# Patient Record
Sex: MALE | Race: WHITE
[De-identification: names, ages, dates, MRNs, and addresses within clinical notes are randomized per-mention and may not be internally consistent; named-entity substitution may affect disease eponyms.]

---

## 2017-01-01 ENCOUNTER — HOSPITAL ENCOUNTER (EMERGENCY)
Dept: HOSPITAL 80 - FED | Age: 27
Discharge: HOME | End: 2017-01-01
Payer: COMMERCIAL

## 2017-01-01 VITALS — DIASTOLIC BLOOD PRESSURE: 74 MMHG | HEART RATE: 66 BPM | SYSTOLIC BLOOD PRESSURE: 122 MMHG | OXYGEN SATURATION: 96 %

## 2017-01-01 VITALS — RESPIRATION RATE: 16 BRPM

## 2017-01-01 VITALS — TEMPERATURE: 97.9 F

## 2017-01-01 DIAGNOSIS — Z90.49: ICD-10-CM

## 2017-01-01 DIAGNOSIS — F10.120: ICD-10-CM

## 2017-01-01 DIAGNOSIS — R10.84: Primary | ICD-10-CM

## 2017-01-01 LAB
% IMMATURE GRANULYOCYTES: 0.2 % (ref 0–1.1)
ABSOLUTE IMMATURE GRANULOCYTES: 0.02 10^3/UL (ref 0–0.1)
ABSOLUTE NRBC COUNT: 0 10^3/UL (ref 0–0.01)
ADD DIFF?: NO
ADD MORPH?: NO
ADD SCAN?: NO
ALBUMIN SERPL-MCNC: 5 G/DL (ref 3.5–5)
ALP SERPL-CCNC: 83 IU/L (ref 38–126)
ALT SERPL-CCNC: 96 IU/L (ref 21–72)
AMYLASE SERPL-CCNC: 95 IU/L (ref 30–110)
ANION GAP SERPL CALC-SCNC: 21 MEQ/L (ref 8–16)
APTT BLD: 24.6 SEC (ref 23–38)
AST SERPL-CCNC: 124 IU/L (ref 17–59)
ATYPICAL LYMPHOCYTE FLAG: 0 (ref 0–99)
BILIRUB SERPL-MCNC: 0.4 MG/DL (ref 0.1–1.4)
BILIRUBIN-CONJUGATED: 0.3 MG/DL (ref 0–0.5)
BILIRUBIN-UNCONJUGATED: 0.1 MG/DL (ref 0–1.1)
CALCIUM SERPL-MCNC: 9.2 MG/DL (ref 8.5–10.4)
CHLORIDE SERPL-SCNC: 107 MEQ/L (ref 97–110)
CO2 SERPL-SCNC: 20 MEQ/L (ref 22–31)
COLOR UR: (no result)
CREAT SERPL-MCNC: 1 MG/DL (ref 0.7–1.3)
ERYTHROCYTE [DISTWIDTH] IN BLOOD BY AUTOMATED COUNT: 12.1 % (ref 11.5–15.2)
ETHANOL SERPL-MCNC: 203 MG/DL (ref 0–10)
FRAGMENT RBC FLAG: 0 (ref 0–99)
GFR SERPL CREATININE-BSD FRML MDRD: > 60 ML/MIN/{1.73_M2}
GLUCOSE SERPL-MCNC: 99 MG/DL (ref 70–100)
HCT VFR BLD CALC: 42.9 % (ref 40–51)
HGB BLD-MCNC: 15.1 G/DL (ref 13.7–17.5)
INR PPP: 1.08 (ref 0.83–1.16)
LEFT SHIFT FLG: 0 (ref 0–99)
LIPEMIA HEMOLYSIS FLAG: 90 (ref 0–99)
MCH RBC BLDCO QN: 30.7 PG (ref 27.9–34.1)
MCHC RBC AUTO-ENTMCNC: 35.2 G/DL (ref 32.4–36.7)
MCV RBC AUTO: 87.2 FL (ref 81.5–99.8)
NITRITE UR QL STRIP: NEGATIVE
NRBC-AUTO%: 0 % (ref 0–0.2)
PH UR STRIP: 6 [PH] (ref 5–7.5)
PLATELET # BLD: 275 10^3/UL (ref 150–400)
PLATELET CLUMPS FLAG: 60 (ref 0–99)
PMV BLD AUTO: 9.3 FL (ref 8.7–11.7)
POTASSIUM SERPL-SCNC: 3.3 MEQ/L (ref 3.5–5.2)
PROT SERPL-MCNC: 7.5 G/DL (ref 6.3–8.2)
PROTHROMBIN TIME: 13.9 SEC (ref 12–15)
RBC # BLD AUTO: 4.92 10^6/UL (ref 4.4–6.38)
SODIUM SERPL-SCNC: 148 MEQ/L (ref 134–144)
SP GR UR STRIP: 1.01 (ref 1–1.03)

## 2017-01-01 PROCEDURE — G0480 DRUG TEST DEF 1-7 CLASSES: HCPCS

## 2017-01-01 NOTE — CT
CT Scan of the Abdomen and Pelvis (With Contrast) at 0313 hours

 

History:  Epigastric pain after binge drinking, cholecystectomy 2 weeks ago. Nausea and vomiting.

 

Comparison: None available.

 

Technique: Axial computed tomographic images of the abdomen and pelvis were obtained with the unevent
ful intravenous administration of 90 mL Isovue-300 contrast. No oral or rectal contrast which limits 
the study. Dose reduction techniques were utilized.

 

CT Abdomen Findings:

 

Lung bases: Normal.

Liver: Normal.

Biliary system: Gallbladder is surgically absent. No biliary ductal dilation. No fluid collection in 
the gallbladder fossa. No evidence of bile leak. No pneumoperitoneum.

Spleen: Normal.

Pancreas: Normal.

Adrenals: Normal.

Kidneys: No obstruction or solid masses.

Abdominal Aorta: No aneurysm.

No bowel obstruction, ascites, or significant retroperitoneal lymphadenopathy.

 

CT Pelvis Findings: Appendix appears normal without inflammatory changes. No pelvic fluid collections
.

 

Impression:

1. Prior cholecystectomy without biliary ductal dilation, biliary leak, pneumoperitoneum or drainable
 abscess.

2. No CT evidence of appendicitis, abscess or bowel obstruction.

 

 

Findings and recommendations discussed with Emergency Department physician, Dr. Patrick Lazar at 03
40 hours today.

 

Final report concurs with initial preliminary interpretation.

## 2017-01-01 NOTE — EDPHY
H & P


HPI/ROS: 


HPI





CHIEF COMPLAINT:  Nausea, vomiting, epigastric abdominal pain





HISTORY OF PRESENT ILLNESS:  This patient 26-year-old male, recent 

cholecystectomy at [facility], he had his gallbladder removed on December 21st, he presents to the emergency room by EMS with epigastric abdominal pain 

nausea vomiting.  Patient tells me that he had multiple liquor drinks and beer 

this evening girlfriend at bedside is an RN.  She states that he started 

complaining of epigastric pain and nausea vomiting after multiple alcoholic 

beverages.  He tells me that he just had his gallbladder removed on December 21st.  He now has 10/10 burning epigastric pain with associated nausea.





Past Medical History: denies significant medical history





Past Surgical History:  Cholecystectomy on December 21st





Social History:  Denies daily use drugs alcohol tobacco products, had a large 

amount of alcohol this evening





Family History:  Noncontributory








ROS   


REVIEW OF SYSTEMS:


A comprehensive 10 point review of systems is otherwise negative aside from 

elements mentioned in the history of present illness.








Exam   


Constitutional   triage nursing summary reviewed, vital signs reviewed, awake/

alert. 


Eyes   normal conjunctivae and sclera, EOMI, PERRLA. 


HENT   normal inspection, atraumatic, moist mucus membranes, no epistaxis, neck 

supple/ no meningismus, no raccoon eyes. 


Respiratory   clear to auscultation bilaterally, normal breath sounds, no 

respiratory distress, no wheezing. 


Cardiovascular   rate normal, regular rhythm, no murmur, no edema, distal 

pulses normal. 


Gastrointestinal   soft however mild tenderness to palpation epigastric region,

  no rebound, no guarding, normal bowel sounds, no distension, no pulsatile 

mass. 


Genitourinary   no CVA tenderness. 


Musculoskeletal  no midline vertebral tenderness, full range of motion, no calf 

swelling, no tenderness of extremities, no meningismus, good pulses, 

neurovascularly intact.


Skin   pink, warm, & dry, no rash, skin atraumatic. 


Neurologic   awake, alert and oriented x 3, AAOx3, moves all 4 extremities 

equally, motor intact, sensory intact, CN II-XII intact, normal cerebellar, 

normal vision, normal speech. 


Psychiatric   normal mood/affect. 


Heme/Lymph/Immune   no lymphadenopathy





Differential diagnosis includes but is not limited to and in no particular order

: acute alcohol-induced pancreatitis, acute alcohol-induced gastritis  Bowel 

obstruction, appendicitis, gallbladder disease, diverticulitis, colitis, 

enteritis, perforated viscus, gastritis, GERD, esophagitis, urinary tract 

infection, pyelonephritis, kidney stones





Re-evaluation: this patient had an IV established obtain blood work, check 

lipase,  he will be hydrated with IV fluids, Zofran and Dilaudid for pain 

control check an alcohol level.  Most likely has alcohol-induced pancreatitis 

after recent gallbladder surgery.  May need abdominal imaging.








CT scan of the abdomen pelvis with IV contrast  The results of the study are 

negative for acute inflammatory process..  The study was read by Dr. Fay  I 

viewed the images myself on the PACS system.





0418:  Re-evaluation at this time patient is resting comfortably I re-evaluated 

his abdomen is soft nontender no guarding or peritoneal signs.  His surgical 

incisions from recent laparoscopic cholecystectomy appear intact. He feels much 

better after IV fluids nausea medicine pain medicine.  His CT scan has been 

reviewed shows no acute inflammatory process.  Specifically in the right upper 

quadrant there is no fluid collection.  No evidence of acute pancreatitis.  

Blood work has been reviewed.  Patient receiving a 3 L fluid at this time will 

repeat his lactic after 3 L.


Source: Patient, EMS


Constitutional: 


 Initial Vital Signs











Temperature (C)  36.6 C   01/01/17 01:42


 


Heart Rate  84   01/01/17 01:42


 


Respiratory Rate  16   01/01/17 01:42


 


Blood Pressure  124/74 H  01/01/17 01:42


 


O2 Sat (%)  96   01/01/17 01:42








 











O2 Delivery Mode               Nasal Cannula


 


O2 (L/minute)                  2














Allergies/Adverse Reactions: 


 





No Known Allergies Allergy (Unverified 01/01/17 01:42)


 








Home Medications: 














 Medication  Instructions  Recorded


 


Allergy Med   01/01/17


 


Ondansetron HCl [Zofran] 4 mg PO Q4-6PRN PRN #10 tablet 01/01/17














Medical Decision Making





- Data Points


Laboratory Results: 


 Laboratory Results





 01/01/17 01:45 





 01/01/17 01:45 





 











  01/01/17 01/01/17 01/01/17





  03:30 02:35 01:45


 


WBC      9.57 H 10^3/uL





     (3.80-9.50) 


 


RBC      4.92 10^6/uL





     (4.40-6.38) 


 


Hgb      15.1 g/dL





     (13.7-17.5) 


 


Hct      42.9 %





     (40.0-51.0) 


 


MCV      87.2 fL





     (81.5-99.8) 


 


MCH      30.7 pg





     (27.9-34.1) 


 


MCHC      35.2 g/dL





     (32.4-36.7) 


 


RDW      12.1 %





     (11.5-15.2) 


 


Plt Count      275 10^3/uL





     (150-400) 


 


MPV      9.3 fL





     (8.7-11.7) 


 


Neut % (Auto)      36.7 L %





     (39.3-74.2) 


 


Lymph % (Auto)      47.8 H %





     (15.0-45.0) 


 


Mono % (Auto)      7.7 %





     (4.5-13.0) 


 


Eos % (Auto)      5.7 %





     (0.6-7.6) 


 


Baso % (Auto)      1.9 H %





     (0.3-1.7) 


 


Nucleat RBC Rel Count      0.0 %





     (0.0-0.2) 


 


Absolute Neuts (auto)      3.51 10^3/uL





     (1.70-6.50) 


 


Absolute Lymphs (auto)      4.57 H 10^3/uL





     (1.00-3.00) 


 


Absolute Monos (auto)      0.74 10^3/uL





     (0.30-0.80) 


 


Absolute Eos (auto)      0.55 H 10^3/uL





     (0.03-0.40) 


 


Absolute Basos (auto)      0.18 H 10^3/uL





     (0.02-0.10) 


 


Absolute Nucleated RBC      0.00 10^3/uL





     (0-0.01) 


 


Immature Gran %      0.2 %





     (0.0-1.1) 


 


Immature Gran #      0.02 10^3/uL





     (0.00-0.10) 


 


PT      13.9 SEC





     (12.0-15.0) 


 


INR      1.08 





     (0.83-1.16) 


 


APTT      24.6 SEC





     (23.0-38.0) 


 


VBG Lactic Acid    3.0 H D mmol/L  4.4 H mmol/L





    (0.7-2.1)   (0.7-2.1) 


 


Sodium      148 H mEq/L





     (134-144) 


 


Potassium      3.3 L mEq/L





     (3.5-5.2) 


 


Chloride      107 mEq/L





     () 


 


Carbon Dioxide      20 L mEq/l





     (22-31) 


 


Anion Gap      21 mEq/L





     (8-16) 


 


BUN      12 mg/dL





     (7-23) 


 


Creatinine      1.0 mg/dL





     (0.7-1.3) 


 


Estimated GFR      > 60 





    


 


Glucose      99 mg/dL





     () 


 


Calcium      9.2 mg/dL





     (8.5-10.4) 


 


Total Bilirubin      0.4 mg/dL





     (0.1-1.4) 


 


Conjugated Bilirubin      0.3 mg/dL





     (0.0-0.5) 


 


Unconjugated Bilirubin      0.1 mg/dL





     (0.0-1.1) 


 


AST      124 H IU/L





     (17-59) 


 


ALT      96 H IU/L





     (21-72) 


 


Alkaline Phosphatase      83 IU/L





     () 


 


Total Protein      7.5 g/dL





     (6.3-8.2) 


 


Albumin      5.0 g/dL





     (3.5-5.0) 


 


Amylase      95 IU/L





     () 


 


Lipase      195.0 IU/L





     () 


 


Urine Color  Pending     





    


 


Urine Appearance  Pending     





    


 


Urine pH  Pending     





    


 


Ur Specific Gravity  Pending     





    


 


Urine Protein  Pending     





    


 


Urine Ketones  Pending     





    


 


Urine Blood  Pending     





    


 


Urine Nitrate  Pending     





    


 


Urine Bilirubin  Pending     





    


 


Urine Urobilinogen  Pending     





    


 


Ur Leukocyte Esterase  Pending     





    


 


Ur Culture Indicated?  Pending     





    


 


Urine Glucose  Pending     





    


 


Ethyl Alcohol      203 H mg/dL





     (0-10) 











Medications Given: 


 








Discontinued Medications





Hydromorphone HCl (Dilaudid)  0.5 mg IVP ONCE ONE


   Stop: 01/01/17 01:41


   Last Admin: 01/01/17 01:50 Dose:  0.5 mg


Sodium Chloride (Ns)  2,000 mls @ 0 mls/hr IV ONCE ONE


   PRN Reason: Wide Open


   Stop: 01/01/17 01:40


   Last Admin: 01/01/17 01:50 Dose:  2,000 mls


Ondansetron HCl (Zofran)  4 mg IVP EDNOW ONE


   Stop: 01/01/17 01:40


   Last Admin: 01/01/17 01:50 Dose:  4 mg








Departure





- Departure


Disposition: Home, Routine, Self-Care


Clinical Impression: 


Alcoholic intoxication


Qualifiers:


 Complication of substance-induced condition: uncomplicated Qualifier Code: (

F10.120) Alcohol abuse with intoxication, uncomplicated





Abdominal pain


Qualifiers:


 Abdominal location: generalized Qualifier Code: (R10.84) Generalized abdominal 

pain


Condition: Good


Instructions:  Abdominal Pain (ED), Acute Nausea and Vomiting (ED), Abuse of 

Alcohol (ED), Alcohol Intoxication (ED)


Additional Instructions: 


1. Return to the emergency room if you have any worsening symptoms questions or 

concerns includes worsening abdominal pain, fever, vomiting.


2. please do not drink alcohol for the next 2 weeks.


Referrals: 


NONE *PRIMARY CARE P,. [Primary Care Provider] - As per Instructions


Prescriptions: 


Ondansetron HCl [Zofran] 4 mg PO Q4-6PRN PRN #10 tablet


 PRN Reason: Nausea/Vomiting, Use 1st

## 2018-03-29 ENCOUNTER — HOSPITAL ENCOUNTER (EMERGENCY)
Dept: HOSPITAL 80 - FED | Age: 28
Discharge: HOME | End: 2018-03-29
Payer: COMMERCIAL

## 2018-03-29 VITALS — SYSTOLIC BLOOD PRESSURE: 121 MMHG | DIASTOLIC BLOOD PRESSURE: 77 MMHG

## 2018-03-29 DIAGNOSIS — Z90.49: ICD-10-CM

## 2018-03-29 DIAGNOSIS — R10.9: Primary | ICD-10-CM

## 2018-03-29 LAB — PLATELET # BLD: 251 10^3/UL (ref 150–400)

## 2018-03-29 NOTE — EDPHY
H & P


Stated Complaint: seen a LMU for back pain 4 days ago, now c/o nausea and 

feeling weird


Time Seen by Provider: 03/29/18 19:31


HPI/ROS: 


HPI:  This is a 27-year-old male who presents with





Chief Complaint: seen a LMU for back pain 4 days ago, now c/o nausea and 

feeling weird








Location:  Right flank


Quality:  Pain


Duration:  2 weeks


Signs and Symptoms: no fever, + nausea, no vomiting, no hematemesis, no blood 

in stool, no abdominal bloating, no diarrhea, no back pain, no urinary symptoms

, no testicular/groin pain, no indigestion, no chest pain, no shortness of 

breath


Timing:  Worse over the last 4 days


Severity:  Moderate to severe


Context:  Patient is generally healthy, status post cholecystectomy, presents 

with complaints of 2 week history of right flank pain that waxes and wane.  

When the pain occurs a causes him to become nauseous and"feel weird".  He also 

feels lightheaded and dizzy.  Eating and drinking normally.  Sits at a desk for 

his job.  Denies any chronic back pain/injury/trauma.  Has no urinary symptoms 

and denies blood in his urine.  No prior history of kidney stones.  Head 

recently seen within the last month his general surgeon and reference to 

possibly having stones in his common bile duct.  Patient was seen at [facility] 4 days ago for back pain and given muscle relaxers and Lidoderm 

patches with minimal improvement.  Denies any trauma/radiation/weakness/

paresthesias.  No difficulty with ambulation. 


Modifying Factors:  See above





Comment: 








ROS: see HPI


Constitutional: No fever, no chills, no weight loss


Eyes:  No blurred vision


Respiratory:  No shortness of breath, no cough


Cardiovascular:  No chest pain, no palpitations


Gastrointestinal:  + nausea, no vomiting, no diarrhea, no hematemesis, no blood 

in stool 


Genitourinary:  No dysuria, no blood in urine 


Extremities:  No myalgias, no edema


Neurologic:  No weakness, no numbness


Skin:  No rashes, no petechiae


Hematologic:  No bruising, no bleeding








MEDICAL/SURGICAL/SOCIAL HISTORY: 


Medical history:  Generally healthy.  Does not take any regular medications.


Surgical history:  Cholecystectomy


Social history:  Employed. 








CONSTITUTIONAL:  Extremely well-appearing adult white male, slightly anxious, 

awake and alert, no obvious distress


HEENT: Atraumatic and normocephalic, PERRL, EOMI. Tympanic membranes clear. 

Oropharynx clear, no exudate and moist pink mucosa.  Airway patent.  No 

lymphadenopathy.  No meningismus.


Cardiovascular: Normal S1/S2, regular rate, regular rhythm, without murmur rub 

or gallop.


PULMONARY/CHEST:  Symmetrical and nontender. Clear to auscultation bilaterally. 

Good air movement. No accessory muscle usage.


ABDOMEN:  Soft, nondistended, nontender, no rebound, no guarding, no peritoneal 

signs, no masses or organomegaly.  Right flank tenderness.


BACK:  No midline tenderness, no paraspinous spasm, deep tendon reflexes 2/2, 

no pain with straight leg raise, No foot drop.  Achilles reflexes are equal 

bilaterally.  Able to walk on heels and toes without difficulty.


EXTREMITIES:  2/2 pulses, strength 5/5, no deformities, no clubbing, no 

cyanosis or edema.


NEUROLOGICAL: no focal neuro deficits.  GCS 15.


SKIN: Warm and dry, no erythema. no rash.  Good capillary refill. 


  





Source: Patient, Family (Wife )


Exam Limitations: No limitations





- Personal History


Current Tetanus Diphtheria and Acellular Pertussis (TDAP): No





- Medical/Surgical History


Hx Asthma: No


Hx Chronic Respiratory Disease: No


Hx Diabetes: No


Hx Cardiac Disease: No


Hx Renal Disease: No


Hx Cirrhosis: No


Hx Alcoholism: No


Hx HIV/AIDS: No


Hx Splenectomy or Spleen Trauma: No


Other PMH: cholecysectomy





- Social History


Smoking Status: Never smoked


Constitutional: 


 Initial Vital Signs











Temperature (C)  36.7 C   03/29/18 19:18


 


Heart Rate  71   03/29/18 19:18


 


Respiratory Rate  16   03/29/18 19:18


 


Blood Pressure  140/84 H  03/29/18 19:18


 


O2 Sat (%)  100   03/29/18 19:18








 











O2 Delivery Mode               Room Air














Allergies/Adverse Reactions: 


 





No Known Allergies Allergy (Unverified 01/01/17 01:42)


 








Home Medications: 














 Medication  Instructions  Recorded


 


Baclofen  03/29/18


 


Excedrin Migraine Geltab  03/29/18


 


Lidocaine Patch  03/29/18


 


Zofran  03/29/18














Medical Decision Making





- Diagnostics


Imaging Results: 


 Imaging Impressions





Abdomen CT  03/29/18 20:10


Impression:


 


No acute abnormalities in the abdomen or pelvis.


 


Dr. Marsala discussed these findings by telephone with Ange Chun at 2052 

hours on 3/29/2018.


 











ED Course/Re-evaluation: 


Labs, CT abdomen and pelvis scan, IV fluids, UA, oral medication


Given Zofran oral tablet. 


2030:  Labs reviewed and no signs of leukocytosis, anemia, acute kidney injury, 

VTE, electrolyte imbalance, transaminitis


2050:  Called by Radiology who advised that CT abdomen and pelvis scan shows no 

signs of appendicitis, nephrolithiasis, ureterolithiasis, thoracic or lumbar 

degenerative disc disease or spondylosis.  Kidneys appear to be normal. 


No signs of neurovascular compromise/tenting of skin/compartment syndrome/

extremities and joints examined above and below area of concern and are 

neurovascularly intact. 


Abdomen soft and nontender; doubt surgical pathology. 


Suspect musculoskeletal etiology











This patient was seen under the supervision of my secondary supervising 

physician.  I evaluated care for this patient independently.  





Differential Diagnosis: 


Flank pain including but not limited to musculoskeletal causes, kidney stone, 

pyelonephritis, shingles, and intra-abdominal causes such as diverticulitis and 

appendicitis.








- Data Points


Laboratory Results: 


 Laboratory Results





 03/29/18 20:00 





 03/29/18 20:00 





 











  03/29/18 03/29/18 03/29/18





  20:05 20:00 20:00


 


WBC      7.15 10^3/uL 10^3/uL





     (3.80-9.50) 


 


RBC      5.15 10^6/uL 10^6/uL





     (4.40-6.38) 


 


Hgb      15.5 g/dL g/dL





     (13.7-17.5) 


 


Hct      45.3 % %





     (40.0-51.0) 


 


MCV      88.0 fL fL





     (81.5-99.8) 


 


MCH      30.1 pg pg





     (27.9-34.1) 


 


MCHC      34.2 g/dL g/dL





     (32.4-36.7) 


 


RDW      11.9 % %





     (11.5-15.2) 


 


Plt Count      251 10^3/uL 10^3/uL





     (150-400) 


 


MPV      8.9 fL fL





     (8.7-11.7) 


 


Neut % (Auto)      54.9 % %





     (39.3-74.2) 


 


Lymph % (Auto)      28.5 % %





     (15.0-45.0) 


 


Mono % (Auto)      9.0 % %





     (4.5-13.0) 


 


Eos % (Auto)      6.0 % %





     (0.6-7.6) 


 


Baso % (Auto)      1.5 % %





     (0.3-1.7) 


 


Nucleat RBC Rel Count      0.0 % %





     (0.0-0.2) 


 


Absolute Neuts (auto)      3.92 10^3/uL 10^3/uL





     (1.70-6.50) 


 


Absolute Lymphs (auto)      2.04 10^3/uL 10^3/uL





     (1.00-3.00) 


 


Absolute Monos (auto)      0.64 10^3/uL 10^3/uL





     (0.30-0.80) 


 


Absolute Eos (auto)      0.43 10^3/uL H 10^3/uL





     (0.03-0.40) 


 


Absolute Basos (auto)      0.11 10^3/uL H 10^3/uL





     (0.02-0.10) 


 


Absolute Nucleated RBC      0.00 10^3/uL 10^3/uL





     (0-0.01) 


 


Immature Gran %      0.1 % %





     (0.0-1.1) 


 


Immature Gran #      0.01 10^3/uL 10^3/uL





     (0.00-0.10) 


 


D-Dimer  < 0.27 ug/mLFEU ug/mLFEU    





   (0.00-0.50)   


 


Sodium    143 mEq/L mEq/L  





    (135-145)  


 


Potassium    4.0 mEq/L mEq/L  





    (3.5-5.2)  


 


Chloride    104 mEq/L mEq/L  





    ()  


 


Carbon Dioxide    25 mEq/l mEq/l  





    (22-31)  


 


Anion Gap    14 mEq/L mEq/L  





    (8-16)  


 


BUN    12 mg/dL mg/dL  





    (7-23)  


 


Creatinine    1.0 mg/dL mg/dL  





    (0.7-1.3)  


 


Estimated GFR    > 60   





    


 


Glucose    88 mg/dL mg/dL  





    ()  


 


Calcium    9.5 mg/dL mg/dL  





    (8.5-10.4)  


 


Total Bilirubin    0.6 mg/dL mg/dL  





    (0.1-1.4)  


 


Conjugated Bilirubin    0.3 mg/dL mg/dL  





    (0.0-0.5)  


 


Unconjugated Bilirubin    0.3 mg/dL mg/dL  





    (0.0-1.1)  


 


AST    20 IU/L IU/L  





    (17-59)  


 


ALT    32 IU/L IU/L  





    (21-72)  


 


Alkaline Phosphatase    59 IU/L IU/L  





    ()  


 


Total Protein    7.5 g/dL g/dL  





    (6.3-8.2)  


 


Albumin    4.8 g/dL g/dL  





    (3.5-5.0)  











Medications Given: 


 








Discontinued Medications





Ondansetron HCl (Zofran Odt)  4 mg PO EDNOW ONE


   Stop: 03/29/18 19:46


   Last Admin: 03/29/18 20:00 Dose:  4 mg








Departure





- Departure


Disposition: Home, Routine, Self-Care


Clinical Impression: 


 Right flank pain





Condition: Good


Instructions:  Flank Pain (ED)


Additional Instructions: 


Return to the ER immediately if you experience new, continued or worsening 

abdominal pain, fevers/chills, inability to tolerate oral intake, new pain, or 

any other symptoms that concern you.








Continue to use Lidoderm patches muscle relaxers as needed. 


Use proper ergonomics. 








Referrals: 


PCP Not In,Dictionary [Medical Doctor] - 5-7 days, if not improved